# Patient Record
Sex: FEMALE | Race: WHITE | HISPANIC OR LATINO | Employment: OTHER | URBAN - METROPOLITAN AREA
[De-identification: names, ages, dates, MRNs, and addresses within clinical notes are randomized per-mention and may not be internally consistent; named-entity substitution may affect disease eponyms.]

---

## 2020-12-02 RX ORDER — HYDROCODONE BITARTRATE AND ACETAMINOPHEN 5; 325 MG/1; MG/1
1 TABLET ORAL EVERY 8 HOURS PRN
COMMUNITY

## 2020-12-02 RX ORDER — ANASTROZOLE 1 MG/1
1 TABLET ORAL DAILY
COMMUNITY

## 2020-12-08 ENCOUNTER — HOSPITAL ENCOUNTER (OUTPATIENT)
Dept: INTERVENTIONAL RADIOLOGY/VASCULAR | Facility: HOSPITAL | Age: 65
Discharge: HOME | End: 2020-12-08
Admitting: NURSE PRACTITIONER

## 2020-12-08 VITALS
WEIGHT: 150 LBS | RESPIRATION RATE: 15 BRPM | BODY MASS INDEX: 25.61 KG/M2 | HEIGHT: 64 IN | SYSTOLIC BLOOD PRESSURE: 112 MMHG | HEART RATE: 98 BPM | OXYGEN SATURATION: 96 % | DIASTOLIC BLOOD PRESSURE: 58 MMHG | TEMPERATURE: 98 F

## 2020-12-08 DIAGNOSIS — C50.911 RECURRENT MALIGNANT NEOPLASM OF RIGHT BREAST (HCC): ICD-10-CM

## 2020-12-08 LAB
APTT PPP: 25.3 SECONDS (ref 24–31)
BASOPHILS # BLD AUTO: 0.1 10*3/MM3 (ref 0–0.2)
BASOPHILS NFR BLD AUTO: 0.8 % (ref 0–1.5)
DEPRECATED RDW RBC AUTO: 53.4 FL (ref 37–54)
EOSINOPHIL # BLD AUTO: 0.1 10*3/MM3 (ref 0–0.4)
EOSINOPHIL NFR BLD AUTO: 0.7 % (ref 0.3–6.2)
ERYTHROCYTE [DISTWIDTH] IN BLOOD BY AUTOMATED COUNT: 14.6 % (ref 12.3–15.4)
HCT VFR BLD AUTO: 43.6 % (ref 34–46.6)
HGB BLD-MCNC: 14.1 G/DL (ref 12–15.9)
INR PPP: <0.93 (ref 0.93–1.1)
LYMPHOCYTES # BLD AUTO: 1.7 10*3/MM3 (ref 0.7–3.1)
LYMPHOCYTES NFR BLD AUTO: 20.7 % (ref 19.6–45.3)
MCH RBC QN AUTO: 32.9 PG (ref 26.6–33)
MCHC RBC AUTO-ENTMCNC: 32.3 G/DL (ref 31.5–35.7)
MCV RBC AUTO: 101.8 FL (ref 79–97)
MONOCYTES # BLD AUTO: 0.6 10*3/MM3 (ref 0.1–0.9)
MONOCYTES NFR BLD AUTO: 7.4 % (ref 5–12)
MRSA DNA SPEC QL NAA+PROBE: NORMAL
NEUTROPHILS NFR BLD AUTO: 5.7 10*3/MM3 (ref 1.7–7)
NEUTROPHILS NFR BLD AUTO: 70.4 % (ref 42.7–76)
NRBC BLD AUTO-RTO: 0 /100 WBC (ref 0–0.2)
PLATELET # BLD AUTO: 326 10*3/MM3 (ref 140–450)
PMV BLD AUTO: 7.2 FL (ref 6–12)
PROTHROMBIN TIME: 10.2 SECONDS (ref 9.6–11.7)
RBC # BLD AUTO: 4.28 10*6/MM3 (ref 3.77–5.28)
WBC # BLD AUTO: 8.1 10*3/MM3 (ref 3.4–10.8)

## 2020-12-08 PROCEDURE — 99152 MOD SED SAME PHYS/QHP 5/>YRS: CPT

## 2020-12-08 PROCEDURE — 25010000002 ONDANSETRON PER 1 MG: Performed by: RADIOLOGY

## 2020-12-08 PROCEDURE — 25010000003 LIDOCAINE 1 % SOLUTION: Performed by: RADIOLOGY

## 2020-12-08 PROCEDURE — 99153 MOD SED SAME PHYS/QHP EA: CPT

## 2020-12-08 PROCEDURE — 76937 US GUIDE VASCULAR ACCESS: CPT

## 2020-12-08 PROCEDURE — C1769 GUIDE WIRE: HCPCS

## 2020-12-08 PROCEDURE — 85025 COMPLETE CBC W/AUTO DIFF WBC: CPT | Performed by: RADIOLOGY

## 2020-12-08 PROCEDURE — 25010000003 HEPARIN LOCK FLUSH PER 10 UNITS: Performed by: RADIOLOGY

## 2020-12-08 PROCEDURE — 25010000002 MIDAZOLAM PER 1 MG: Performed by: RADIOLOGY

## 2020-12-08 PROCEDURE — 85610 PROTHROMBIN TIME: CPT | Performed by: RADIOLOGY

## 2020-12-08 PROCEDURE — 25010000002 FENTANYL CITRATE (PF) 100 MCG/2ML SOLUTION: Performed by: RADIOLOGY

## 2020-12-08 PROCEDURE — 87641 MR-STAPH DNA AMP PROBE: CPT | Performed by: NURSE PRACTITIONER

## 2020-12-08 PROCEDURE — 77001 FLUOROGUIDE FOR VEIN DEVICE: CPT

## 2020-12-08 PROCEDURE — C1894 INTRO/SHEATH, NON-LASER: HCPCS

## 2020-12-08 PROCEDURE — C1788 PORT, INDWELLING, IMP: HCPCS

## 2020-12-08 PROCEDURE — 85730 THROMBOPLASTIN TIME PARTIAL: CPT | Performed by: RADIOLOGY

## 2020-12-08 RX ORDER — LIDOCAINE HYDROCHLORIDE 10 MG/ML
INJECTION, SOLUTION INFILTRATION; PERINEURAL
Status: COMPLETED | OUTPATIENT
Start: 2020-12-08 | End: 2020-12-08

## 2020-12-08 RX ORDER — LIDOCAINE HYDROCHLORIDE AND EPINEPHRINE 10; 10 MG/ML; UG/ML
INJECTION, SOLUTION INFILTRATION; PERINEURAL
Status: COMPLETED | OUTPATIENT
Start: 2020-12-08 | End: 2020-12-08

## 2020-12-08 RX ORDER — ONDANSETRON 2 MG/ML
INJECTION INTRAMUSCULAR; INTRAVENOUS
Status: COMPLETED | OUTPATIENT
Start: 2020-12-08 | End: 2020-12-08

## 2020-12-08 RX ORDER — SODIUM CHLORIDE 9 MG/ML
75 INJECTION, SOLUTION INTRAVENOUS CONTINUOUS
Status: DISCONTINUED | OUTPATIENT
Start: 2020-12-08 | End: 2020-12-09 | Stop reason: HOSPADM

## 2020-12-08 RX ORDER — HEPARIN SODIUM (PORCINE) LOCK FLUSH IV SOLN 100 UNIT/ML 100 UNIT/ML
SOLUTION INTRAVENOUS
Status: COMPLETED | OUTPATIENT
Start: 2020-12-08 | End: 2020-12-08

## 2020-12-08 RX ORDER — MIDAZOLAM HYDROCHLORIDE 1 MG/ML
INJECTION INTRAMUSCULAR; INTRAVENOUS
Status: COMPLETED | OUTPATIENT
Start: 2020-12-08 | End: 2020-12-08

## 2020-12-08 RX ORDER — FENTANYL CITRATE 50 UG/ML
INJECTION, SOLUTION INTRAMUSCULAR; INTRAVENOUS
Status: COMPLETED | OUTPATIENT
Start: 2020-12-08 | End: 2020-12-08

## 2020-12-08 RX ADMIN — CEFAZOLIN SODIUM 1 G: 1 INJECTION, POWDER, FOR SOLUTION INTRAMUSCULAR; INTRAVENOUS at 10:33

## 2020-12-08 RX ADMIN — MIDAZOLAM 1 MG: 1 INJECTION INTRAMUSCULAR; INTRAVENOUS at 10:58

## 2020-12-08 RX ADMIN — LIDOCAINE HYDROCHLORIDE 8 ML: 10 INJECTION, SOLUTION INFILTRATION; PERINEURAL at 11:03

## 2020-12-08 RX ADMIN — FENTANYL CITRATE 100 MCG: 50 INJECTION, SOLUTION INTRAMUSCULAR; INTRAVENOUS at 10:58

## 2020-12-08 RX ADMIN — HEPARIN SODIUM (PORCINE) LOCK FLUSH IV SOLN 100 UNIT/ML 500 UNITS: 100 SOLUTION at 11:26

## 2020-12-08 RX ADMIN — LIDOCAINE HYDROCHLORIDE 4 ML: 10 INJECTION, SOLUTION INFILTRATION; PERINEURAL at 11:14

## 2020-12-08 RX ADMIN — LIDOCAINE HYDROCHLORIDE,EPINEPHRINE BITARTRATE 5 ML: 10; .01 INJECTION, SOLUTION INFILTRATION; PERINEURAL at 11:15

## 2020-12-08 RX ADMIN — ONDANSETRON 4 MG: 2 INJECTION INTRAMUSCULAR; INTRAVENOUS at 10:52

## 2020-12-08 RX ADMIN — MIDAZOLAM 1 MG: 1 INJECTION INTRAMUSCULAR; INTRAVENOUS at 11:17

## 2020-12-08 NOTE — NURSING NOTE
Luis dressing port site and right neck site identical:  Betadine, steri strips and 2x2 with tegraderm.

## 2020-12-08 NOTE — DISCHARGE INSTRUCTIONS
Implanted Port Home Guide  An implanted port is a device that is placed under the skin. It is usually placed in the chest. The device can be used to give IV medicine, to take blood, or for dialysis. You may have an implanted port if:  · You need IV medicine that would be irritating to the small veins in your hands or arms.  · You need IV medicines, such as antibiotics, for a long period of time.  · You need IV nutrition for a long period of time.  · You need dialysis.  Having a port means that your health care provider will not need to use the veins in your arms for these procedures. You may have fewer limitations when using a port than you would if you used other types of long-term IVs, and you will likely be able to return to normal activities after your incision heals.  An implanted port has two main parts:  · [place]. The reservoir is the part where a needle is inserted to give medicines or draw blood. The reservoir is round. After it is placed, it appears as a small, raised area under your skin.  · Catheter. The catheter is a thin, flexible tube that connects the reservoir to a vein. Medicine that is inserted into the reservoir goes into the catheter and then into the vein.  How is my port accessed?  To access your port:  · A numbing cream may be placed on the skin over the port site.  · Your health care provider will put on a mask and sterile gloves.  · The skin over your port will be cleaned carefully with a germ-killing soap and allowed to dry.  · Your health care provider will gently pinch the port and insert a needle into it.  · Your health care provider will check for a blood return to make sure the port is in the vein and is not clogged.  · If your port needs to remain accessed to get medicine continuously (constant infusion), your health care provider will place a clear bandage (dressing) over the needle site. The dressing and needle will need to be changed every week, or as told by your health care  provider.  What is flushing?  Flushing helps keep the port from getting clogged. Ports are usually flushed with saline solution or a medicine called heparin. The need for flushing will depend on how the port is used:  · If the port is only used from time to time to give medicines or draw blood, the port may need to be flushed:  ? Before and after medicines have been given.  ? Before and after blood has been drawn.  ? As part of routine maintenance. Flushing may be recommended every 4-6 weeks.  · If a constant infusion is running, the port may not need to be flushed.  · Throw away any syringes in a disposal container that is meant for sharp items (sharps container). You can buy a sharps container from a pharmacy, or you can make one by using an empty hard plastic bottle with a cover.  How long will my port stay implanted?  The port can stay in for as long as your health care provider thinks it is needed. When it is time for the port to come out, a surgery will be done to remove it. The surgery will be similar to the procedure that was done to put the port in.  Follow these instructions at home:    · Flush your port as told by your health care provider.  · If you need an infusion over several days, follow instructions from your health care provider about how to take care of your port site. Make sure you:  ? Wash your hands with soap and water before you change your dressing. If soap and water are not available, use alcohol-based hand .  ? Change your dressing as told by your health care provider.  ? Place any used dressings or infusion bags into a plastic bag. Throw that bag in the trash.  ? Keep the dressing that covers the needle clean and dry. Do not get it wet.  ? Do not use scissors or sharp objects near the tube.  ? Keep the tube clamped, unless it is being used.  · Check your port site every day for signs of infection. Check for:  ? Redness, swelling, or pain.  ? Fluid or blood.  ? Pus or a bad  smell.  · Protect the skin around the port site.  ? Avoid wearing bra straps that rub or irritate the site.  ? Protect the skin around your port from seat belts. Place a soft pad over your chest if needed.  · Bathe or shower after dressing changes are finished and dressing is removed. The site may get wet as long as you are not actively receiving an infusion.  · Return to your normal activities in 48 hours.  · Carry a medical alert card or wear a medical alert bracelet at all times. This will let health care providers know that you have an implanted port in case of an emergency.  Get help right away if:  · You have redness, swelling, or pain at the port site.  · You have fluid or blood coming from your port site.  · You have pus or a bad smell coming from the port site.  · You have a fever.  Summary  · Implanted ports are usually placed in the chest for long-term IV access.  · Follow instructions from your health care provider about flushing the port and changing bandages (dressings).  · Take care of the area around your port by avoiding clothing that puts pressure on the area, and by watching for signs of infection.  · Protect the skin around your port from seat belts. Place a soft pad over your chest if needed.  · Get help right away if you have a fever or you have redness, swelling, pain, drainage, or a bad smell at the port site.    Implanted Port Insertion, Care After    What can I expect after the procedure?  After the procedure, it is common to have:  · Discomfort at the port insertion site.  · Bruising on the skin over the port. This should improve over 3-4 days.  Follow these instructions at home:  Port care  · After your port is placed, you will get a 's information card. The card has information about your port. Keep this card with you at all times.  · Take care of the port as told by your health care provider. Ask your health care provider if you or a family member can get training for taking  care of the port at home. A home health care nurse may also take care of the port.  · Make sure to remember what type of port you have.  Incision care         · Follow instructions from your health care provider about how to take care of your port insertion site. Make sure you:  ? Wash your hands with soap and water before and after you change your bandage (dressing). If soap and water are not available, use hand .  ? Change your dressing as told by your health care provider.  ? Leave stitches (sutures), skin glue, or adhesive strips in place. These skin closures may need to stay in place for 2 weeks or longer. If adhesive strip edges start to loosen and curl up, you may trim the loose edges. Do not remove adhesive strips completely unless your health care provider tells you to do that.  · Check your port insertion site every day for signs of infection. Check for:  ? Redness, swelling, or pain.  ? Fluid or blood.  ? Warmth.  ? Pus or a bad smell.  Activity  · Return to your normal activities in 48 hours.  · Do not lift anything that is heavier than 10 lb (4.5 kg).  General instructions  · Take over-the-counter and prescription medicines for pain.  · Do not take baths, swim, or use a hot tub until your port site is healed. You may shower after the dressing is removed.. You may only be allowed to take sponge baths.  · Do not drive for 24 hours if you were given a sedative during your procedure.  · Wear a medical alert bracelet in case of an emergency. This will tell any health care providers that you have a port.  · Keep all follow-up visits . This is important.  Contact a health care provider if:  · You cannot flush your port with saline as directed, or you cannot draw blood from the port.  · You have a fever or chills.  · You have redness, swelling, or pain around your port insertion site.  · You have fluid or blood coming from your port insertion site.  · Your port insertion site feels warm to the  touch.  · You have pus or a bad smell coming from the port insertion site.  Get help right away if:  · You have chest pain or shortness of breath.  · You have bleeding from your port that you cannot control.  Summary  · Keep the 's information card with you at all times.  · Contact a health care provider if you have a fever or chills or if you have redness, swelling, or pain around your port insertion site.  · Keep all follow-up visits as told by your health care provider.

## 2020-12-10 ENCOUNTER — HOSPITAL ENCOUNTER (OUTPATIENT)
Dept: INFUSION THERAPY | Facility: HOSPITAL | Age: 65
Setting detail: INFUSION SERIES
Discharge: HOME | End: 2020-12-10

## 2020-12-10 VITALS
DIASTOLIC BLOOD PRESSURE: 69 MMHG | TEMPERATURE: 97.9 F | HEART RATE: 89 BPM | SYSTOLIC BLOOD PRESSURE: 110 MMHG | RESPIRATION RATE: 20 BRPM

## 2020-12-10 DIAGNOSIS — C50.911 MALIGNANT NEOPLASM OF RIGHT FEMALE BREAST, UNSPECIFIED ESTROGEN RECEPTOR STATUS, UNSPECIFIED SITE OF BREAST (HCC): ICD-10-CM

## 2020-12-10 PROBLEM — C50.919 BREAST CANCER (HCC): Status: ACTIVE | Noted: 2020-12-10

## 2020-12-10 NOTE — PROGRESS NOTES
Pt arrived to [facility] for first dressing change after port placement.  Single stitch noted in upper incision.  Steri strips present at insertion  site, intact.  No swelling or bruising noted.  Area cleaned with chloraprep and new pressure dressing applied.  Pt will have 2nd dressing change at M.D. office.  Will return here 12/16/2020

## 2020-12-16 ENCOUNTER — HOSPITAL ENCOUNTER (OUTPATIENT)
Dept: INFUSION THERAPY | Facility: HOSPITAL | Age: 65
Setting detail: INFUSION SERIES
Discharge: HOME | End: 2020-12-16

## 2020-12-16 DIAGNOSIS — C50.911 MALIGNANT NEOPLASM OF RIGHT FEMALE BREAST, UNSPECIFIED ESTROGEN RECEPTOR STATUS, UNSPECIFIED SITE OF BREAST (HCC): ICD-10-CM

## 2020-12-16 PROCEDURE — G0463 HOSPITAL OUTPT CLINIC VISIT: HCPCS

## 2020-12-16 NOTE — PROGRESS NOTES
Pt arrived for evaluation of port placement incision. Dressing removed, no drainage noted. Steri strips intact, edges approximated. No drainage or swelling noted around site. Cleaned with chloraprep and left uncovered. Educated pt on steri strips should not be pulled. Let fall off on their own. If patient notices pain, swelling or drainage call  For further instructions. Pt verbalized understanding

## 2024-04-28 ENCOUNTER — INPATIENT HOSPITAL (OUTPATIENT)
Dept: URBAN - METROPOLITAN AREA HOSPITAL 76 | Facility: HOSPITAL | Age: 69
End: 2024-04-28
Payer: MEDICARE

## 2024-04-28 DIAGNOSIS — R13.10 DYSPHAGIA, UNSPECIFIED: ICD-10-CM

## 2024-04-28 PROCEDURE — 99222 1ST HOSP IP/OBS MODERATE 55: CPT | Performed by: INTERNAL MEDICINE

## 2024-04-29 ENCOUNTER — INPATIENT HOSPITAL (OUTPATIENT)
Dept: URBAN - METROPOLITAN AREA HOSPITAL 76 | Facility: HOSPITAL | Age: 69
End: 2024-04-29
Payer: MEDICARE

## 2024-04-29 DIAGNOSIS — R13.10 DYSPHAGIA, UNSPECIFIED: ICD-10-CM

## 2024-04-29 PROCEDURE — 99232 SBSQ HOSP IP/OBS MODERATE 35: CPT

## 2024-04-30 ENCOUNTER — INPATIENT HOSPITAL (OUTPATIENT)
Dept: URBAN - METROPOLITAN AREA HOSPITAL 76 | Facility: HOSPITAL | Age: 69
End: 2024-04-30
Payer: MEDICARE

## 2024-04-30 DIAGNOSIS — R13.10 DYSPHAGIA, UNSPECIFIED: ICD-10-CM

## 2024-04-30 PROCEDURE — 99232 SBSQ HOSP IP/OBS MODERATE 35: CPT | Performed by: NURSE PRACTITIONER

## 2024-05-01 ENCOUNTER — INPATIENT HOSPITAL (OUTPATIENT)
Dept: URBAN - METROPOLITAN AREA HOSPITAL 84 | Facility: HOSPITAL | Age: 69
End: 2024-05-01
Payer: MEDICARE

## 2024-05-01 DIAGNOSIS — R13.10 DYSPHAGIA, UNSPECIFIED: ICD-10-CM

## 2024-05-01 PROCEDURE — 99232 SBSQ HOSP IP/OBS MODERATE 35: CPT | Performed by: NURSE PRACTITIONER

## 2024-05-22 ENCOUNTER — OFFICE (OUTPATIENT)
Dept: URBAN - METROPOLITAN AREA CLINIC 64 | Facility: CLINIC | Age: 69
End: 2024-05-22
Payer: MEDICARE

## 2024-05-22 VITALS
DIASTOLIC BLOOD PRESSURE: 58 MMHG | DIASTOLIC BLOOD PRESSURE: 67 MMHG | SYSTOLIC BLOOD PRESSURE: 94 MMHG | SYSTOLIC BLOOD PRESSURE: 86 MMHG | WEIGHT: 138 LBS | HEART RATE: 122 BPM | HEIGHT: 64 IN

## 2024-05-22 DIAGNOSIS — R13.10 DYSPHAGIA, UNSPECIFIED: ICD-10-CM

## 2024-05-22 DIAGNOSIS — Z79.01 LONG TERM (CURRENT) USE OF ANTICOAGULANTS: ICD-10-CM

## 2024-05-22 PROCEDURE — 99214 OFFICE O/P EST MOD 30 MIN: CPT | Performed by: INTERNAL MEDICINE
